# Patient Record
Sex: FEMALE | Employment: FULL TIME | URBAN - METROPOLITAN AREA
[De-identification: names, ages, dates, MRNs, and addresses within clinical notes are randomized per-mention and may not be internally consistent; named-entity substitution may affect disease eponyms.]

---

## 2024-07-18 ENCOUNTER — APPOINTMENT (OUTPATIENT)
Dept: INTERNAL MEDICINE | Age: 29
End: 2024-07-18

## 2025-03-27 ENCOUNTER — HOSPITAL ENCOUNTER (OUTPATIENT)
Age: 30
Discharge: HOME | End: 2025-03-27
Payer: COMMERCIAL

## 2025-03-27 VITALS
HEART RATE: 63 BPM | OXYGEN SATURATION: 98 % | RESPIRATION RATE: 18 BRPM | TEMPERATURE: 99 F | SYSTOLIC BLOOD PRESSURE: 137 MMHG | DIASTOLIC BLOOD PRESSURE: 69 MMHG

## 2025-03-27 DIAGNOSIS — R11.2 NAUSEA AND VOMITING IN ADULT: Primary | ICD-10-CM

## 2025-03-27 LAB
#MXD IC: 0.9 X10ˆ3/UL (ref 0.1–1)
B-HCG UR QL: NEGATIVE
BUN BLD-MCNC: 11 MG/DL (ref 7–18)
CHLORIDE BLD-SCNC: 103 MMOL/L (ref 98–112)
CO2 BLD-SCNC: 23 MMOL/L (ref 21–32)
CREAT BLD-MCNC: 1 MG/DL
EGFRCR SERPLBLD CKD-EPI 2021: 79 ML/MIN/1.73M2 (ref 60–?)
GLUCOSE BLD-MCNC: 96 MG/DL (ref 70–99)
GLUCOSE UR STRIP-MCNC: NEGATIVE MG/DL
HCT VFR BLD AUTO: 41.9 %
HCT VFR BLD CALC: 47 %
HGB BLD-MCNC: 13.6 G/DL
HGB UR QL STRIP: NEGATIVE
ISTAT IONIZED CALCIUM FOR CHEM 8: 1.22 MMOL/L (ref 1.12–1.32)
KETONES UR STRIP-MCNC: NEGATIVE MG/DL
LEUKOCYTE ESTERASE UR QL STRIP: NEGATIVE
LYMPHOCYTES # BLD AUTO: 3 X10ˆ3/UL (ref 1–4)
LYMPHOCYTES NFR BLD AUTO: 33.8 %
MCH RBC QN AUTO: 30 PG (ref 26–34)
MCHC RBC AUTO-ENTMCNC: 32.5 G/DL (ref 31–37)
MCV RBC AUTO: 92.3 FL (ref 80–100)
MIXED CELL %: 10.5 %
NEUTROPHILS # BLD AUTO: 5.1 X10ˆ3/UL (ref 1.5–7.7)
NEUTROPHILS NFR BLD AUTO: 55.7 %
NITRITE UR QL STRIP: NEGATIVE
PH UR STRIP: 6 [PH]
PLATELET # BLD AUTO: 332 X10ˆ3/UL (ref 150–450)
POCT INFLUENZA A: NEGATIVE
POCT INFLUENZA B: NEGATIVE
POTASSIUM BLD-SCNC: 4.2 MMOL/L (ref 3.6–5.1)
RBC # BLD AUTO: 4.54 X10ˆ6/UL
SARS-COV-2 RNA RESP QL NAA+PROBE: NOT DETECTED
SODIUM BLD-SCNC: 141 MMOL/L (ref 136–145)
SP GR UR STRIP: >=1.03
UROBILINOGEN UR STRIP-ACNC: <2 MG/DL
WBC # BLD AUTO: 9 X10ˆ3/UL (ref 4–11)

## 2025-03-27 PROCEDURE — 99204 OFFICE O/P NEW MOD 45 MIN: CPT | Performed by: PHYSICIAN ASSISTANT

## 2025-03-27 PROCEDURE — U0002 COVID-19 LAB TEST NON-CDC: HCPCS | Performed by: PHYSICIAN ASSISTANT

## 2025-03-27 PROCEDURE — 85025 COMPLETE CBC W/AUTO DIFF WBC: CPT | Performed by: PHYSICIAN ASSISTANT

## 2025-03-27 PROCEDURE — 81025 URINE PREGNANCY TEST: CPT | Performed by: PHYSICIAN ASSISTANT

## 2025-03-27 PROCEDURE — 81002 URINALYSIS NONAUTO W/O SCOPE: CPT | Performed by: PHYSICIAN ASSISTANT

## 2025-03-27 PROCEDURE — 96374 THER/PROPH/DIAG INJ IV PUSH: CPT | Performed by: PHYSICIAN ASSISTANT

## 2025-03-27 PROCEDURE — 80047 BASIC METABLC PNL IONIZED CA: CPT | Performed by: PHYSICIAN ASSISTANT

## 2025-03-27 PROCEDURE — 87502 INFLUENZA DNA AMP PROBE: CPT | Performed by: PHYSICIAN ASSISTANT

## 2025-03-27 RX ORDER — ONDANSETRON 2 MG/ML
4 INJECTION INTRAMUSCULAR; INTRAVENOUS ONCE
Status: COMPLETED | OUTPATIENT
Start: 2025-03-27 | End: 2025-03-27

## 2025-03-27 RX ORDER — ONDANSETRON 4 MG/1
4 TABLET, ORALLY DISINTEGRATING ORAL EVERY 4 HOURS PRN
Qty: 10 TABLET | Refills: 0 | Status: SHIPPED | OUTPATIENT
Start: 2025-03-27 | End: 2025-04-03

## 2025-03-27 NOTE — ED PROVIDER NOTES
Patient Seen in: [facility]      History     Chief Complaint   Patient presents with    Nausea     Stated Complaint: abdominal pain,diarrhea,vomiting,chills,headache    Subjective:   HPI    29-year-old female presenting for evaluation of abdominal bloating, cramping for the last 24 hours.  Patient reports yesterday she developed nausea and vomiting in addition to chills.  States last bowel movement was 3 days ago, normal per patient.  Patient denies any urinary symptoms.  Patient is sexually active, states pregnancy test a few days ago was negative.  Of note she reports the symptoms started within 1 hour of eating lunch at [employer].    Objective:     Past Medical History:    Esophageal reflux              History reviewed. No pertinent surgical history.             Social History     Socioeconomic History    Marital status: Single   Tobacco Use    Smoking status: Never    Smokeless tobacco: Never   Vaping Use    Vaping status: Never Used   Substance and Sexual Activity    Alcohol use: Not Currently    Drug use: Yes     Types: Cannabis     Comment: daily use last used 3-9-2025 (uses joints orblunts)     Social Drivers of Health     Food Insecurity: Food Insecurity Present (3/13/2025)    NCSS - Food Insecurity     Worried About Running Out of Food in the Last Year: Yes     Ran Out of Food in the Last Year: Yes   Transportation Needs: Unmet Transportation Needs (3/13/2025)    NCSS - Transportation     Lack of Transportation: Yes   Housing Stability: At Risk (3/13/2025)    NCSS - Housing/Utilities     Has Housing: Yes     Worried About Losing Housing: Yes     Unable to Get Utilities: No              Review of Systems    Positive for stated complaint: abdominal pain,diarrhea,vomiting,chills,headache  Other systems are as noted in HPI.  Constitutional and vital signs reviewed.      All other systems reviewed and negative except as noted above.    Physical Exam     ED Triage Vitals [03/27/25 0922]   /69    Pulse 63   Resp 18   Temp 98.5 °F (36.9 °C)   Temp src Oral   SpO2 98 %   O2 Device None (Room air)       Current Vitals:   Vital Signs  BP: 137/69  Pulse: 63  Resp: 18  Temp: 98.5 °F (36.9 °C)  Temp src: Oral    Oxygen Therapy  SpO2: 98 %  O2 Device: None (Room air)        Physical Exam  Vitals and nursing note reviewed.   Constitutional:       General: She is not in acute distress.  HENT:      Head: Normocephalic and atraumatic.      Right Ear: External ear normal.      Left Ear: External ear normal.      Nose: Nose normal.      Mouth/Throat:      Mouth: Mucous membranes are moist.   Eyes:      Extraocular Movements: Extraocular movements intact.      Pupils: Pupils are equal, round, and reactive to light.   Cardiovascular:      Rate and Rhythm: Normal rate.   Pulmonary:      Effort: Pulmonary effort is normal.   Abdominal:      General: Abdomen is flat.   Musculoskeletal:         General: Normal range of motion.      Cervical back: Normal range of motion.   Skin:     General: Skin is warm.   Neurological:      General: No focal deficit present.      Mental Status: She is alert and oriented to person, place, and time.   Psychiatric:         Mood and Affect: Mood normal.         Behavior: Behavior normal.             ED Course     Labs Reviewed   [facility] POCT URINALYSIS DIPSTICK - Abnormal; Notable for the following components:       Result Value    Urine Clarity Cloudy (*)     Protein urine Trace (*)     Bilirubin, Urine Small (*)     All other components within normal limits   POCT PREGNANCY URINE - Normal   POCT ISTAT CHEM8 CARTRIDGE - Normal   POCT FLU TEST - Normal    Narrative:     This assay is a rapid molecular in vitro test utilizing nucleic acid amplification of influenza A and B viral RNA.   RAPID SARS-COV-2 BY PCR - Normal   POCT CBC          29-year-old female presenting from home for evaluation of nausea, vomiting which started yesterday after eating [employer].  Patient is afebrile in the immediate care.   There is no abdominal distention and no tenderness on exam    Ddx-gastroenteritis, norovirus, pregnancy, UTI, influenza, COVID  UA cloudy with small bilirubin, no blood and no leukocyte.  Pregnancy test is negative.  Influenza and COVID test negative  CBC, BMP reassuring, no leukocytosis and normal creatinine.  Patient received Zofran and IV fluids with significant improvement in the pain.  She feels comfortable managing her symptoms at home.  She remains with no abdominal distention or tenderness on exam.     Zofran Rx to pharmacy on file for nausea.  ER return precautions advised         MDM              Medical Decision Making      Disposition and Plan     Clinical Impression:  1. Nausea and vomiting in adult         Disposition:  Discharge  3/27/2025 11:34 am    Follow-up:  No follow-up provider specified.        Medications Prescribed:  Discharge Medication List as of 3/27/2025 11:42 AM        START taking these medications    Details   ondansetron 4 MG Oral Tablet Dispersible Take 1 tablet (4 mg total) by mouth every 4 (four) hours as needed for Nausea., Normal, Disp-10 tablet, R-0                 Supplementary Documentation:

## 2025-03-27 NOTE — ED INITIAL ASSESSMENT (HPI)
Abdominal bloating and cramping that started 2 day ago. Last normal bm 3/24. +nausea, chills and vomiting since yesterday.